# Patient Record
Sex: FEMALE | Race: WHITE
[De-identification: names, ages, dates, MRNs, and addresses within clinical notes are randomized per-mention and may not be internally consistent; named-entity substitution may affect disease eponyms.]

---

## 2017-02-22 ENCOUNTER — HOSPITAL ENCOUNTER (OUTPATIENT)
Dept: HOSPITAL 74 - JOR | Age: 47
Discharge: HOME | End: 2017-02-22
Attending: SURGERY
Payer: COMMERCIAL

## 2017-02-22 VITALS — DIASTOLIC BLOOD PRESSURE: 76 MMHG | HEART RATE: 100 BPM | SYSTOLIC BLOOD PRESSURE: 120 MMHG

## 2017-02-22 VITALS — TEMPERATURE: 97.8 F

## 2017-02-22 VITALS — BODY MASS INDEX: 31.6 KG/M2

## 2017-02-22 DIAGNOSIS — J45.909: ICD-10-CM

## 2017-02-22 DIAGNOSIS — T80.219A: Primary | ICD-10-CM

## 2017-02-22 DIAGNOSIS — C79.60: ICD-10-CM

## 2017-02-22 DIAGNOSIS — Z85.3: ICD-10-CM

## 2017-02-22 DIAGNOSIS — B99.9: ICD-10-CM

## 2017-02-22 DIAGNOSIS — I42.9: ICD-10-CM

## 2017-02-22 LAB
ALBUMIN SERPL-MCNC: 3.4 G/DL (ref 3.4–5)
ALP SERPL-CCNC: 94 U/L (ref 45–117)
ALT SERPL-CCNC: 21 U/L (ref 12–78)
ANION GAP SERPL CALC-SCNC: 9 MMOL/L (ref 8–16)
AST SERPL-CCNC: 15 U/L (ref 15–37)
BILIRUB SERPL-MCNC: 0.3 MG/DL (ref 0.2–1)
CALCIUM SERPL-MCNC: 8 MG/DL (ref 8.5–10.1)
CO2 SERPL-SCNC: 30 MMOL/L (ref 21–32)
CREAT SERPL-MCNC: 1.2 MG/DL (ref 0.55–1.02)
DEPRECATED RDW RBC AUTO: 13.8 % (ref 11.6–15.6)
EOSINOPHIL # BLD: 1 % (ref 0–4.5)
GLUCOSE SERPL-MCNC: 78 MG/DL (ref 74–106)
MCH RBC QN AUTO: 32.9 PG (ref 25.7–33.7)
MCHC RBC AUTO-ENTMCNC: 35.1 G/DL (ref 32–36)
MCV RBC: 93.8 FL (ref 80–96)
METAMYELOCYTES NFR BLD: 1 % (ref 0–2)
NEUTROPHILS # BLD: 12 % (ref 42.8–82.8)
PLATELET # BLD AUTO: 100 K/MM3 (ref 134–434)
PMV BLD: 9.9 FL (ref 7.5–11.1)
PROT SERPL-MCNC: 6.5 G/DL (ref 6.4–8.2)
WBC # BLD AUTO: 3.5 K/MM3 (ref 4–10)

## 2017-02-22 PROCEDURE — 0JPT0XZ REMOVAL OF TUNNELED VASCULAR ACCESS DEVICE FROM TRUNK SUBCUTANEOUS TISSUE AND FASCIA, OPEN APPROACH: ICD-10-PCS | Performed by: SURGERY

## 2017-02-22 RX ADMIN — HYDROMORPHONE HYDROCHLORIDE PRN MG: 1 INJECTION, SOLUTION INTRAMUSCULAR; INTRAVENOUS; SUBCUTANEOUS at 14:20

## 2017-02-22 RX ADMIN — HYDROMORPHONE HYDROCHLORIDE PRN MG: 1 INJECTION, SOLUTION INTRAMUSCULAR; INTRAVENOUS; SUBCUTANEOUS at 14:35

## 2017-02-22 NOTE — OP
Operative Note





- Note:


Operative Date: 02/22/17


Pre-Operative Diagnosis: infected portacath


Operation: Removal of portacath


Findings: 


cultures taken from wound 


Post-Operative Diagnosis: Same as Pre-op


Surgeon: Marino Smith


Anesthesia: Fractional


Estimated Blood Loss (mls): 10


Operative Report Dictated: Yes

## 2017-02-22 NOTE — PDOC
History of Present Illness





- General


History Source: Patient


Exam Limitations: No Limitations





- History of Present Illness


Initial Comments: 


02/22/17 11:07


The patient is a 46-year-old woman, with a significant past medical history of 

asthma, cardiomyopathy, breast ([id number] status post bilateral mastectomies) 

and ovarian Ca (Stage III; on IV taxol; Finished her third cycle last week), 

colon polyps,  who presents to the emergency department for further evaluation 

of a possible reocurring left sided  infected chest port since yesterday. As 

per patient, she reports that she noted slight redness around her port, 

approximately two weeks . She was on a ten day course of Keflex, for which she 

finished approximately 5 days ago. She states both peripheral and port blood 

cultures were drawn and were negative. She reports she has remained asymptomatic

, no fevers, chills, cough, chest pain, lightheadedness, dizziness, palpitations

, abdominal pain, nausea, vomiting. She reports noting redness around her port, 

yesterday. She states she has remained NPO. 





Upon interview, patients PMD, Dr. Olga Lidia Hicks was at the bedside. She reports 

she has spoken to Vascular Surgeon, Dr. Ileana Gunter and is aware about this 

case.





Allergies: Piperacillin Sodium, Tazobactam. Docetaxel.


Past Surgical History: Incarcerated hernia. Lumbar laminectomy. Bilateral 

Mastectomies.


Social History: Nurse. Former smoker. No ETOH and recreational drug use. 


Primary Care Physician: Dr. Olga Lidia Hicks Office: [phone]/ [phone]


Oncologist: Dr. Jarocho France [phone]


Vascular Surgeon: Dr. Marino Gunter [phone] 





<Rose Lindsay - Last Filed: 02/22/17 14:01>





<Negrito Vences - Last Filed: 02/24/17 09:07>





- General


Chief Complaint: Wound


Stated Complaint: INFFECTED PORT/ CATHERER


Time Seen by Provider: 02/22/17 11:05





Past History





<Rose Lindsay - Last Filed: 02/22/17 14:01>





- Past Medical History


Anemia: No


Asthma: Yes


Cancer: Yes (BREAST CA,ovarian ca stage 3)


Cardiac Disorders: No


CVA: No


COPD: No


CHF: No


Dementia: No


Diabetes: No


GI Disorders: No


 Disorders: No


HTN: No


Hypercholesterolemia: No


Liver Disease: No


Seizures: No


Thyroid Disease: No





- Surgical History


Abdominal Surgery: Yes (incarcerated hernia)


Appendectomy: No


Cardiac Surgery: No


Cholecystectomy: No


Lung Surgery: No


Neurologic Surgery: No


Orthopedic Surgery: Yes (LUMBAR LAMINECTOMY)





- Psycho/Social/Smoking Cessation Hx


Anxiety: No


Suicidal Ideation: No


Smoking History: Former smoker


Have you smoked in the past 12 months: Yes


If you are a former smoker, when did you quit?: 6 months


Information on smoking cessation initiated: No


'Breaking Loose' booklet given: 05/09/16


Hx Alcohol Use: No


Drug/Substance Use Hx: No


Substance Use Type: None


Hx Substance Use Treatment: No





<Negrito Vences - Last Filed: 02/24/17 09:07>





- Past Medical History


Allergies/Adverse Reactions: 


 Allergies











Allergy/AdvReac Type Severity Reaction Status Date / Time


 


piperacillin sodium Allergy Severe Hives Verified 02/22/17 10:55





[From Zosyn]     


 


tazobactam sodium Allergy Severe Hives Verified 02/22/17 10:55





[From Zosyn]     


 


docetaxel [From Taxotere] Allergy   Verified 02/22/17 10:55


 


PROLINE SUTURES Allergy Severe  Uncoded 02/22/17 10:55











Home Medications: 


Ambulatory Orders





Budesonide/Formeterol Fumarate [SYMBICORT 160/4.5mcg -] 1 inh PO BID #1 inhaler 

10/13/14 


Esomeprazole Magnesium [Nexium 24Hr] 20 mg PO DAILY PRN 05/09/16 


Nebivolol HCl [Bystolic] 2.5 mg PO HS 05/09/16 


Acetaminophen [Tylenol .Regular Strength -] 325 mg PO Q6H PRN #0 tablet 05/11/ 16 


Ramipril 2.5 mg PO HS 11/04/16 


Oxycodone HCl/Acetaminophen [Percocet 5-325 mg Tablet] 1 - 2 tab PO Q4H #20 

tablet MDD 6 11/07/16 


Furosemide [Lasix -] 20 mg PO DAILY 12/19/16 


Magnesium Oxide [Magnesium] 500 mg PO BID 12/19/16 


Potassium Chloride [K-Dur -] 20 meq PO BID 12/19/16 


Cephalexin Monohydrate [Keflex -] 500 mg PO Q8H #30 capsule 02/22/17 


Filgrastim [Neupogen] 480 mcg IJ DAILY #0  02/22/17 











**Review of Systems





- Review of Systems


Able to Perform ROS?: Yes


Comments:: 


02/22/17 11:07


CONSTITUTIONAL: 


No reported: Fever, Chills, Diaphoresis, Generalized Weakness, Malaise, Loss of 

Appetite 


HEENT: 


No reported: Rhinorrhea, Nasal Congestion, Throat Pain, Throat Swelling, 

Difficulty Swallowing, Mouth Swelling, Ear Pain, Eye Pain, Visual Changes 


CARDIOVASCULAR: 


Reported: Chest Pain. Eleevated Blood Pressure and Heart Rate. No reported: 

Syncope, Irregular Heart Rate, Lightheadedness, Peripheral Edema 


RESPIRATORY: 


No reported: Cough, Shortness of Breath, SOB with Exertion, Orthopnea, Wheezing

, Stridor, Hemoptysis 


GASTROINTESTINAL: 


No reported: Abdominal pain, Abdominal Distension, Nausea, Vomiting, Diarrhea, 

Constipation, Melena, Hematochezia 


GENITOURINARY: 


Reported: Flank Pain. No reported: Dysuria, Frequency, Urgency, Hesitancy, 

Genital Pain 


MUSCULOSKELETAL: 


Reported: Back Pain. No reported: Myalgia, Arthralgia, Joint Swelling,  Neck 

Pain 


SKIN: 


No reported: Rash, Itching, Pallor 


HEMEATOLOGIC/IMMUNOLOGIC: 


Reported: Possible reocurring left sided chest port infection.No reported: Easy 

Bleeding, Easy Bruising, Lymphadenopathy.


ENDOCRINE: 


No reported: Unexplained Weight Gain, Unexplained Weight Loss, Heat Intolerance

, Cold Intolerance 


NEUROLOGIC: 


No reported: Headache, Focal Weakness, Paresthesias, Vertigo, Lightheadedness, 

Unsteady Gait, Seizure, Mental Status Changes, Incontinence 


PSYCHIATRIC: 


No reported: Anxiety, Depression





<Rose Lindsay - Last Filed: 02/22/17 14:01>





*Physical Exam





- Vital Signs


 Last Vital Signs











Temp Pulse Resp BP Pulse Ox


 


 98.4 F   103 H  18   143/57   98 


 


 02/22/17 10:56  02/22/17 10:56  02/22/17 10:56  02/22/17 10:56  02/22/17 10:56














- Physical Exam


Comments: 


02/22/17 11:07


GENERAL: The patient is awake, alert, and fully oriented, Nontoxic - in no 

acute distress.


HEAD: Normocephalic, atraumatic.


EYES: extraocular movements intact, sclera anicteric, conjunctiva clear.


ENT: Normal voice,  Moist mucous membranes.


NECK: Normal range of motion, supple


LUNGS: Breath sounds equal, clear to auscultation bilaterally.  No wheezes, no 

rhonchi, no rales.


HEART: Regular rate and rhythm,  without murmur, rub or gallop.


ABDOMEN: Soft, nontender, normoactive bowel sounds.  No guarding, no rebound.No 

CVA tenderness


EXTREMITIES: Normal range of motion, no edema.  No clubbing or cyanosis. No 

cords, erythema, or tenderness.


NEUROLOGICAL: No facial assymetry, Normal speech, 


PSYCH: Normal mood, normal affect.


SKIN: There is a left chest port with erythema and warmth over the access site.





<Rose Lindsay - Last Filed: 02/22/17 14:01>





- Vital Signs


 Last Vital Signs











Temp Pulse Resp BP Pulse Ox


 


 98.4 F   103 H  18   143/57   98 


 


 02/22/17 10:56  02/22/17 10:56  02/22/17 10:56  02/22/17 10:56  02/22/17 10:56














<Negrito Vences - Last Filed: 02/24/17 09:07>





ED Treatment Course





- LABORATORY


CBC & Chemistry Diagram: 


 02/22/17 12:28





 02/22/17 12:28





<Rose Lindsay - Last Filed: 02/22/17 14:01>





- LABORATORY


CBC & Chemistry Diagram: 


 02/22/17 12:28





 02/22/17 12:28





<Negrito Vences - Last Filed: 02/24/17 09:07>





Medical Decision Making





- Medical Decision Making





02/22/17 11:38


Call was placed to Dr. Gunter's mobile phone at [phone]. Immediate 

response. Case was discussed.





<Rose Lindsay - Last Filed: 02/22/17 14:01>





- Medical Decision Making


02/22/17 11:24


46y F hx of ovarian ca currently on chemo, with port in the R chest, presents 

with erythema overlying her R chest - pt was formerly on course of keflex 2 

weeks ago for the same with resolution of the erythema, but returned 2 days 

ago. No systemic complaints. Pt chest noted to have erythema/warmth overlying 

access area of her port.  noted slightly tachycardic here but vitals otherwise 

normal.





will discuss with dr. gunter regarding replcement


will obtain repeat labs.cultures





likely admission


case d/w dr. hicks





A portion of this note was documented by scribe services under my direction. I 

have reviewed the details of the note, within reason, and agree with the 

documentation with the following case summary and management plan written by me





<Negrito Vences - Last Filed: 02/24/17 09:07>





*DC/Admit/Observation/Transfer





- Attestations


Scribe Attestion: 


02/22/17 11:07


Documentation prepared by Rose Lindsay, acting as medical scribe for 

Negrito Vences MD.





<Rose Lindsay - Last Filed: 02/22/17 14:01>





- Discharge Dispostion


Admit: Yes





<Negrito Vences - Last Filed: 02/24/17 09:07>


Diagnosis at time of Disposition: 


Infection due to portacath


Qualifiers:


 Encounter type: initial encounter Qualified Code(s): T80.219A - Unspecified 

infection due to central venous catheter, initial encounter





- Discharge Dispostion


Condition at time of disposition: Good





- Prescriptions

## 2017-02-22 NOTE — HP
Admitting History and Physical





- Primary Care Physician


PCP: Olga Lidia Hicks S





- Admission


Chief Complaint: infected port


History of Present Illness: 


The patient is a 46-year-old woman, with a significant past medical history of 

asthma, cardiomyopathy, breast CA ([id number] status post bilateral mastectomies

) and ovarian Ca (Stage III; on IV taxol; Finished her third cycle last week), 

colon polyps,  who presents to the emergency department for further evaluation 

of a possible reocurring left sided  infected chest port since yesterday. As 

per patient, she reports that she noted slight redness around her port, 

approximately two weeks . She was on a ten day course of Keflex, for which she 

finished approximately 5 days ago. She states both peripheral and port blood 

cultures were drawn and were negative. She reports she has remained asymptomatic

, no fevers, chills, cough, chest pain, lightheadedness, dizziness, palpitations

, abdominal pain, nausea, vomiting. She reports noting redness around her port, 

yesterday. She states she has remained NPO. 





Allergies: Piperacillin Sodium, Tazobactam. Docetaxel.


History Source: Patient, Medical Record


Limitations to Obtaining History: No Limitations





- Past Medical History


CNS: Yes: Migraine


Cardiovascular: Yes: CHF, Other (cardiomyopathy s/p chemotx, EF 45%)


Pulmonary: Yes: Asthma


Gastrointestinal: Yes: GERD, Other (hyperplastic colon polyps  and , 

pancreatic cyst on  MRI, hiatal hernia )


Hepatobiliary: Yes: Other (GB sludge)


...LMP: 10/05/15


Heme/Onc: Yes: Cancer (breasts CA bilat s/p mastectomy)





- Past Surgical History


Past Surgical History: Yes: Breast Biopsy, Colonoscopy, , Laminectomy, 

Mastectomy, Upper Endoscopy





- Smoking History


Smoking history: Former smoker


Have you smoked in the past 12 months: Yes


If you are a former smoker, when did you quit?: 6 months





- Alcohol/Substance Use


Hx Alcohol Use: No


History of Substance Use: reports: None





- Social History


Usual Living Arrangement: Yes: With Spouse


ADL: Independent


Occupation: RN Administrative


History of Recent Travel: No





Home Medications





- Allergies


Allergies/Adverse Reactions: 


 Allergies











Allergy/AdvReac Type Severity Reaction Status Date / Time


 


piperacillin sodium Allergy Severe Hives Verified 17 10:55





[From Zosyn]     


 


tazobactam sodium Allergy Severe Hives Verified 17 10:55





[From Zosyn]     


 


docetaxel [From Taxotere] Allergy   Verified 17 10:55


 


PROLINE SUTURES Allergy Severe  Uncoded 17 10:55














- Home Medications


Home Medications: 


Ambulatory Orders





Budesonide/Formeterol Fumarate [SYMBICORT 160/4.5mcg -] 1 inh PO BID #1 inhaler 

10/13/14 


Esomeprazole Magnesium [Nexium 24Hr] 20 mg PO DAILY PRN 16 


Nebivolol HCl [Bystolic] 2.5 mg PO HS 16 


Acetaminophen [Tylenol .Regular Strength -] 325 mg PO Q6H PRN #0 tablet  


Ramipril 2.5 mg PO HS 16 


Oxycodone HCl/Acetaminophen [Percocet 5/325 -] 1 - 2 tab PO Q4H #20 tablet MDD 

6 16 


Furosemide [Lasix -] 20 mg PO DAILY 16 


Magnesium Oxide [Magnesium] 500 mg PO BID 16 


Potassium Chloride [K-Dur -] 20 meq PO BID 16 


Filgrastim [Neupogen] 480 mcg IJ DAILY 17 











Family Disease History





- Family Disease History


Family Disease History: Heart Disease: Father, CA: Mother (Breast cancer, colon 

cancer), Other: Mother, Sister (Sjogren's syndrome)





Review of Systems





- Review of Systems


Constitutional: denies: Chills, Fever


Eyes: denies: Blurred Vision, Double Vision


HENT: denies: Difficult Swallowing, Ear Pain


Neck: denies: Stiffness, Tenderness


Gastrointestinal: denies: Abdominal Pain, Bloating, Constipation, Diarrhea, 

Vomiting


Genitourinary: denies: Dysuria, Flank Pain


Neurological: denies: Change in LOC, Change in Speech, Confusion


Hematology/Lymphatic: denies: Easily Bruised, Excessive Bleeding


Psychiatric: denies: Altered Sleep Pattern, Anxiety, Depression





Physical Examination


Vital Signs: 


 Vital Signs











Temperature  98.4 F   17 10:56


 


Pulse Rate  103 H  17 10:56


 


Respiratory Rate  18   17 10:56


 


Blood Pressure  143/57   17 10:56


 


O2 Sat by Pulse Oximetry (%)  98   17 10:56











Constitutional: Yes: No Distress, Calm


Eyes: Yes: Conjunctiva Clear


HENT: Yes: Atraumatic


Neck: Yes: Supple


Cardiovascular: Yes: Regular Rate and Rhythm


Respiratory: Yes: CTA Bilaterally


Gastrointestinal: Yes: Soft.  No: Distention, Tenderness


Renal/: No: CVA Tenderness - Left, CVA Tenderness - Right


Musculoskeletal: No: Joint Stiffness, Joint Swelling


Extremities: No: Cold, Cool


Edema: No


Integumentary: Yes: Rash (over L upper chest port).  No: Venous Stasis Changes


Neurological: Yes: WNL, Alert, Oriented


...Motor Strength: WNL


Psychiatric: Yes: WNL, Alert, Oriented.  No: Agitated, Suicidal Ideation





Assessment/Plan


The patient is a 46-year-old woman, with a significant past medical history of 

asthma, cardiomyopathy, breast ([id number] status post bilateral mastectomies) 

and ovarian Ca (Stage III; on IV taxol; Finished her third cycle last week), 

colon polyps,  who presents to the emergency department for further evaluation 

of a possible reocurring left sided  infected chest port since yesterday. s/p 

recent po Keflex.


d/w vascular sx dr Smith (covering dr Trujillo who is in vacation)


will removed infected port


PO keflex, further ATB depending on intraop findings


pt received neupogen 1 dose today in onc office, for WBC 2.7

## 2017-02-22 NOTE — DS
Physical Examination


Vital Signs: 


 Vital Signs











Temperature  97.8 F   02/22/17 15:15


 


Pulse Rate  76   02/22/17 15:15


 


Respiratory Rate  16   02/22/17 15:15


 


Blood Pressure  99/50   02/22/17 15:15


 


O2 Sat by Pulse Oximetry (%)  96   02/22/17 15:00











Findings/Remarks: 


s/p port removal for infection


OK to DC home on po Keflex per surgery


f/u with PCP, Oncology and vascular sx in 1-2 weeks


check labs CBC CMP in 2-3 days


cardio f/u as advised


d/w pt scripts doen as needed


Constitutional: Yes: No Distress, Calm


Eyes: Yes: Conjunctiva Clear


HENT: Yes: Atraumatic


Neck: Yes: Supple


Cardiovascular: Yes: Regular Rate and Rhythm


Respiratory: Yes: CTA Bilaterally


Gastrointestinal: Yes: Soft.  No: Distention, Tenderness


Musculoskeletal: No: Joint Stiffness, Joint Swelling


Extremities: No: Cold, Cool


Edema: No


Peripheral Pulses WNL: Yes


Integumentary: No: Rash, Venous Stasis Changes


Neurological: Yes: WNL, Alert, Oriented


...Motor Strength: WNL


Psychiatric: Yes: WNL, Alert, Oriented.  No: Agitated


Labs: 


 CBC, BMP





 02/22/17 12:28 





 02/22/17 12:28 











Discharge Summary


Reason For Visit: INFECTION DUE TO PORTACATH


Current Active Problems





Infection due to portacath (Acute) 








Procedures: Principal: port infection


Other Procedures: removed by vascular sx


Hospital Course: 


po antibiotics; DC home, f/u as advised


Condition: Good





- Instructions


Diet, Activity, Other Instructions: 


S/P portacath removal. 


 keflex on the way home. 


Can remove dressing in two days. 


Leave steri strips on. 


Come back to office in one week. 


5th floor -- Vascular/Wound care clinic


f/u PCP, cardiology and oncology and labs as advised 


call for appt -- [phone]


Disposition: HOME





- Home Medications


Comprehensive Discharge Medication List: 


Ambulatory Orders





Budesonide/Formeterol Fumarate [SYMBICORT 160/4.5mcg -] 1 inh PO BID #1 inhaler 

10/13/14 


Esomeprazole Magnesium [Nexium 24Hr] 20 mg PO DAILY PRN 05/09/16 


Nebivolol HCl [Bystolic] 2.5 mg PO HS 05/09/16 


Acetaminophen [Tylenol .Regular Strength -] 325 mg PO Q6H PRN #0 tablet 05/11/ 16 


Ramipril 2.5 mg PO HS 11/04/16 


Oxycodone HCl/Acetaminophen [Percocet 5-325 mg Tablet] 1 - 2 tab PO Q4H #20 

tablet MDD 6 11/07/16 


Furosemide [Lasix -] 20 mg PO DAILY 12/19/16 


Magnesium Oxide [Magnesium] 500 mg PO BID 12/19/16 


Potassium Chloride [K-Dur -] 20 meq PO BID 12/19/16 


Cephalexin Monohydrate [Keflex -] 500 mg PO Q8H #30 capsule 02/22/17 


Filgrastim [Neupogen] 480 mcg IJ DAILY #0  02/22/17

## 2017-02-23 NOTE — OP
DATE OF OPERATION:  2017

 

PREOPERATIVE DIAGNOSIS:  Infected Port-A-Cath.

 

POSTOPERATIVE DIAGNOSIS:  Infected Port-A-Cath.

 

PROCEDURE:  Removal of Port-A-Cath.

 

SURGEON:  Marino Funez DO

 

ANESTHESIA:  Fractional. 

 

BLOOD LOSS:  10 mL.

 

INDICATIONS:  The patient is a 46-year-old female who has a left subclavian

Port-A-Cath.  She went to her doctor today and found the Port-A-Cath site to be

having a lot of erythema and asked her to come to the hospital to have it removed. 

The patient was consented for the procedure understanding all risks, benefits, and

alternatives then taken to the operating room.

 

DESCRIPTION OF PROCEDURE:  Once in the operating suite, the area of the left neck and

chest were prepped and draped in a sterile surgical manner.  We then went ahead and

injected 20 mL lidocaine 1% over the Port-A-Cath.  We then used a No. 15 blade and

made a 4-cm incision using the knife.  Once the incision was made, Bovie

electrocautery was used to control hemostasis, and we were able to dissect out the

Port-A-Cath and then remove it.  Once removed, there was some bleeding in the area,

which  down when we performed Bovie electrocautery to it.  At this point, we

irrigated the wound copiously and we went ahead and used 3-0 Vicryl.  We put

subcutaneous sutures in to approximate the subcutaneous tissue.  The skin was then

closed with 4-0 Biosyn in a subcuticular running fashion.  The area was wet and

dried.  Steri-Strips were placed.  Then 4 x 4 Tegaderms were placed.  The patient

tolerated the procedure with no complications.

 

 

 

MARINO FUNEZ DO

 

[id number]

DD: 2017 14:36

DT: 2017 13:23

Job #:  [id number]

## 2017-02-24 NOTE — PATH
Surgical Pathology Report



Patient Name:  ORIN GORMAN

Accession #:  

Med. Rec. #:  [id number]                                                        

   /Age/Gender:  1970 (Age: 46) / F

Account:  [id number]                                                          

             Location: AMBULATORY SURG

Taken:  2017

Received:  2017

Reported:  2017

Physicians:  Marino Smith

  



Specimen(s) Received

 PORT-A-CATH 





Clinical History

Infection DH Port-a-cath







Final Diagnosis

PORT-A-CATH, REMOVAL:

MEDICAL DEVICE CONSISTENT WITH PORT-A-CATH (GROSS EXAM).





***Electronically Signed***

Alexander Finkelstein, M.D.





Gross Description

Received fresh, labeled "Port-A-Cath" is a 3.8 x 2.8 x 1.5 cm purple, irregular

device, consistent with a dottie cath. The Port-A-Cath displays a 23 cm in length

portion of white tubing extending from one aspect. No soft tissue is present. No

sections are submitted, gross only.





[facility]

## 2017-03-06 ENCOUNTER — HOSPITAL ENCOUNTER (OUTPATIENT)
Dept: HOSPITAL 74 - JASU-SURG | Age: 47
Discharge: HOME | End: 2017-03-06
Attending: OBSTETRICS & GYNECOLOGY
Payer: COMMERCIAL

## 2017-03-06 VITALS — BODY MASS INDEX: 31.6 KG/M2

## 2017-03-06 VITALS — SYSTOLIC BLOOD PRESSURE: 86 MMHG | DIASTOLIC BLOOD PRESSURE: 56 MMHG | HEART RATE: 78 BPM

## 2017-03-06 VITALS — TEMPERATURE: 98.3 F

## 2017-03-06 DIAGNOSIS — Z46.82: Primary | ICD-10-CM

## 2017-03-06 DIAGNOSIS — C79.60: ICD-10-CM

## 2017-03-06 PROCEDURE — 0WPG03Z REMOVAL OF INFUSION DEVICE FROM PERITONEAL CAVITY, OPEN APPROACH: ICD-10-PCS | Performed by: OBSTETRICS & GYNECOLOGY

## 2017-03-06 NOTE — DS
Physical Examination


Vital Signs: 


 Vital Signs











Temperature  97.6 F   03/06/17 07:40


 


Pulse Rate  100 H  03/06/17 07:40


 


Respiratory Rate  20   03/06/17 07:40


 


Blood Pressure  116/66   03/06/17 07:40


 


O2 Sat by Pulse Oximetry (%)  96   03/06/17 07:37











Constitutional: Yes: Well Nourished


Eyes: Yes: WNL


HENT: Yes: WNL


Neck: Yes: WNL


Cardiovascular: Yes: WNL


Respiratory: Yes: WNL


Gastrointestinal: Yes: WNL





Discharge Summary


Reason For Visit: OVARIAN CANCER


Condition: Good





- Instructions


Diet, Activity, Other Instructions: 


May have regular diet, no heavy lifting and no driving while taking percocet.


Disposition: HOME





- Home Medications


Comprehensive Discharge Medication List: 


Ambulatory Orders





Nebivolol HCl [Bystolic] 2.5 mg PO HS 05/09/16 


Ramipril 2.5 mg PO HS 11/04/16 


Budesonide/Formeterol Fumarate [SYMBICORT 160/4.5mcg -] 1 inh PO BID 03/02/17 


Ranitidine HCl [Zantac] 150 mg PO DAILY PRN 03/02/17

## 2017-03-06 NOTE — OP
Operative Note





- Note:


Operative Date: 03/06/17


Pre-Operative Diagnosis: ovarian cancer


Operation: removal of portacatheter


Findings: 


port on LUQ


Post-Operative Diagnosis: Same as Pre-op


Surgeon: Omari Tarango


Anesthesia: General


Estimated Blood Loss (mls): 0


Operative Report Dictated: Yes

## 2017-03-07 NOTE — OP
DATE OF OPERATION:  03/06/2017 

 

PROCEDURE:  Removal intraperitoneal Port-A-Cath

 

PREOPERATIVE DIAGNOSIS:  Ovarian cancer status post intraperitoneal chemotherapy

 

POSTOPERATVE DIAGNOSIS:  Ovarian cancer status post intraperitoneal chemotherapy

 

SURGEON:  Omari Tarango MD 

 

ANESTHESIA:  General

 

ESTIMATED BLOOD LOSS:  Minimal 

 

OPERATIVE FINDINGS:  The patient has a Port-A-Cath placed at the left upper quadrant

of the abdomen.  

 

OPERATIVE PROCEDURE:  Under general anesthesia, skin was prepped and draped in usual

manner for abdominal procedure.  After time out, an incision was made over the

previous incision.  This was followed by identifying the port and dissecting the port

free from its adjacent tissues and removing the sutures.  The Port was then removed

successfully and completely.  Subcutaneous tissue was irrigated and hemostasis

secured; 0.5% Marcaine 30 mL was given; 2-0 Vicryl suture was used to close the deep

subcutaneous space, and 4-0 Monocryl to skin.  The patient tolerated the procedure

well.  She was then extubated and transported to the recovery room in stable

condition.  Lap and instrument count was correct x 2.  

 

RICHI [id number]

DD: 03/06/2017 08:40

DT: 03/07/2017 03:52

Job #:  [id number]

## 2017-07-26 ENCOUNTER — HOSPITAL ENCOUNTER (INPATIENT)
Dept: HOSPITAL 74 - JASUSAT | Age: 47
LOS: 2 days | Discharge: HOME | DRG: 355 | End: 2017-07-28
Attending: SURGERY | Admitting: SURGERY
Payer: COMMERCIAL

## 2017-07-26 VITALS — BODY MASS INDEX: 33.6 KG/M2

## 2017-07-26 DIAGNOSIS — Z85.3: ICD-10-CM

## 2017-07-26 DIAGNOSIS — K21.9: ICD-10-CM

## 2017-07-26 DIAGNOSIS — J45.909: ICD-10-CM

## 2017-07-26 DIAGNOSIS — K43.0: Primary | ICD-10-CM

## 2017-07-26 DIAGNOSIS — I10: ICD-10-CM

## 2017-07-26 PROCEDURE — 0WUF0JZ SUPPLEMENT ABDOMINAL WALL WITH SYNTHETIC SUBSTITUTE, OPEN APPROACH: ICD-10-PCS | Performed by: SURGERY

## 2017-07-26 RX ADMIN — ACETAMINOPHEN SCH MG: 10 INJECTION, SOLUTION INTRAVENOUS at 16:55

## 2017-07-26 RX ADMIN — RAMIPRIL SCH MG: 2.5 CAPSULE ORAL at 21:23

## 2017-07-26 RX ADMIN — BUDESONIDE AND FORMOTEROL FUMARATE DIHYDRATE SCH PUFF: 160; 4.5 AEROSOL RESPIRATORY (INHALATION) at 21:13

## 2017-07-26 RX ADMIN — ACETAMINOPHEN SCH: 10 INJECTION, SOLUTION INTRAVENOUS at 18:58

## 2017-07-26 RX ADMIN — NEBIVOLOL HYDROCHLORIDE SCH MG: 5 TABLET ORAL at 21:23

## 2017-07-26 RX ADMIN — ACETAMINOPHEN SCH MG: 10 INJECTION, SOLUTION INTRAVENOUS at 23:17

## 2017-07-27 RX ADMIN — ENOXAPARIN SODIUM SCH MG: 40 INJECTION SUBCUTANEOUS at 10:15

## 2017-07-27 RX ADMIN — BUDESONIDE AND FORMOTEROL FUMARATE DIHYDRATE SCH PUFF: 160; 4.5 AEROSOL RESPIRATORY (INHALATION) at 22:16

## 2017-07-27 RX ADMIN — ACETAMINOPHEN SCH MG: 10 INJECTION, SOLUTION INTRAVENOUS at 04:30

## 2017-07-27 RX ADMIN — BUDESONIDE AND FORMOTEROL FUMARATE DIHYDRATE SCH PUFF: 160; 4.5 AEROSOL RESPIRATORY (INHALATION) at 10:14

## 2017-07-27 RX ADMIN — NEBIVOLOL HYDROCHLORIDE SCH: 5 TABLET ORAL at 22:17

## 2017-07-27 RX ADMIN — ACETAMINOPHEN SCH MG: 10 INJECTION, SOLUTION INTRAVENOUS at 10:47

## 2017-07-27 RX ADMIN — RAMIPRIL SCH: 2.5 CAPSULE ORAL at 22:16

## 2017-07-27 NOTE — PN
Progress Note (short form)





- Note


Progress Note: 


Anesthesia POD#1


S/P Open Component Separation with mesh under GA and TAP blook


Vss,no N/V,started orals,pain is under control.


No complications to anesthesia seen.





Lauryn Zuleta MD.

## 2017-07-27 NOTE — PN
Progress Note (short form)





- Note


Progress Note: 


surgery





pt seen and exmained.  pain somewhat controlled.  oob.  voiding.  tolerating 

liquids.  





afebrile





abd- soft, moderate distension, incision clean, sandra serous x 2.





A/P





1)  Pod#1- regular diet, stop ivf, cont sandra





2)  pain- motrin, tylenol, morphine, oxycodone





3)  prophylaxis- lovenox, protonix, oob





4)  htn- betablocker, ace inhibitor





poss d/c tomorrow with better pain control

## 2017-07-27 NOTE — OP
DATE OF OPERATION:  07/26/2017

 

PREOPERATIVE DIAGNOSIS:  Complex, chronically incarcerated ventral/incisional

abdominal wall hernia.

 

POSTOPERATIVE DIAGNOSIS:  Complex, chronically incarcerated ventral/incisional

abdominal wall hernia.

 

PROCEDURE:  Open bilateral-component separation repair of complex, chronically

incarcerated ventral/incisional abdominal wall hernia with mesh.

 

OPERATING SURGEON:  Pool Lozano MD

 

FIRST ASSISTANT:  Eligio Casillas MD

 

ANESTHESIA:  General, Nelda Chase MD

 

HISTORY:  This is a 46-year-old woman who has had several prior abdominal 
procedures.

 She carries a diagnosis of fallopian tube malignancy and has also received 
chemotherapy.  She

presents now for repair of a rather large, complex, chronically incarcerated

ventral/incisional abdominal wall hernia.

 

Indications, alternatives, possible complications reviewed.  Consent obtained.

 

DESCRIPTION OF PROCEDURE:  With the patient in the supine position and after 
general

anesthesia, the abdomen was prepped and draped in sterile fashion using

chlorhexidine.

 

An incision was made in the pre-existing scar beginning above the umbilicus and

extending for a significant distance below the umbilicus.  The incision was 
deepened

into the subcutaneous space.  In the subcutaneous space, the large hernia sac 
was

easily encountered.  The hernia sac was opened.  The hernia contained obvious 
loops

of small bowel.

 

Lysis of adhesions ensued, freeing the small bowel from portions of the hernia 
sac as

well as the fascial ring and the undersurface of the anterior abdominal wall,

ultimately reducing all of the small bowel.

 

First directing our attention to the right side of the anterior abdominal wall, 
the

posterior sheath was  from the overlying rectus muscle fibers.  This

separation and dissection was carried out in the lateral direction both 
inferiorly

and superiorly.  Ultimately, the junction of the oblique and transversus 
musculature

with the lateral rectus edge was encountered.  Transversus abdominis rectus 
release

ensued, and the retrorectus plane was continued to be created again in the 
lateral

direction, inferiorly to the level of the iliac crest and superiorly to the 
level of

the costal margin.

 

Now directing our attention to the patient's left side, again the posterior 
sheath

was  from the overlying rectus musculature, and the retrorectus space

developed in the lateral direction, again proceeding inferiorly and superiorly. 

Again, a left transverse rectus abdominis release was performed under direct 
vision,

and the retrorectus space further developed and extended again laterally, in the

superior direction again to the costal margin and inferior direction to the 
level of

the iliac crests.

 

The posterior sheath was then approximated, re-creating the posterior midline.  
This

was accomplished using a continuous 3-0 Maxon suture.  After the posterior 
midline

was created, a custom Composite mesh was fashioned to span the distance in the

retrorectus space.

 

A piece of Phasix mesh measuring 24.4 cm x 30.5 cm was sutured to a piece of 
Versatex

mesh.  The Versatex mesh was larger in size and subsequently trimmed to the 
actual

Phasix mesh size.  This was accomplished using interrupted, circumferential 3-0

Vicryl sutures.

 

The large custom Composite mesh was then placed in the retrorectus space with 
the

Phasix side down.  The mesh was spread out in all directions, and using an

AbsorbaTack and counter-palpation, the mesh was fixed peripherally to the 
overlying

anterior musculature.  Mesh was tacked at the costal margins superiorly.  The 
mesh

was tacked inferiorly close to the pubic tubercle.  The mesh was tacked 
laterally in

all directions.

 

The retrorectus space and Composite mesh were irrigated and adequate hemostasis

ensured.  The overlying anterior fascia was then closed using No. 1 PDS suture 
in a

continuous fashion.  A suture was started both superiorly and inferiorly and 
was run

in a continuous fashion and ultimately tied at the wound mid-point, while 
leaving the

custom Composite mesh entirely in the retrorectus space.

 

Two Pawan-Lucia drains were placed in the subcutaneous space and exited 
through 2

defects in the skin in the right and left lower quadrants where the drains were

tacked to the skin using 2-0 silk suture material.

 

The subcutaneous space was irrigated and adequate hemostasis ensured.

 

The undersurface of the umbilicus was tacked to the underlying fascia to create 
the

usual inward appearance.  The wound was then closed in layers.  The subcutaneous

tissues were approximated using interrupted 2-0 chromic suture.  The 
subcuticular

layer was approximated with interrupted 4-0 Biosyn sutures.  The skin edges were

approximated using metallic clips.

 

Dermabond applied.  Procedure terminated.

 

Needle and instrument count correct.

 

ESTIMATED BLOOD LOSS:  150 mL

 

SPECIMEN:  Portion of ventral hernia sac which was harvested from the 
subcutaneous

space.

 

DRAINS:  Two JPs.

 

IMPLANT:  Phasix mesh 24.5 cm and 30.5 cm, as well as Versatex monofilament mesh

which initially measured 50 x 50 cm in size, but was cut to the actual size of 
the

Phasix mesh prior to implantation.

 

The patient tolerated the procedure, and the procedure was terminated.

 

 

RICHI [id number]

DD: 07/26/2017 16:02

DT: 07/27/2017 12:59

Job #:  [id number]

MTDD

## 2017-07-28 VITALS — HEART RATE: 74 BPM | DIASTOLIC BLOOD PRESSURE: 49 MMHG | SYSTOLIC BLOOD PRESSURE: 106 MMHG

## 2017-07-28 VITALS — TEMPERATURE: 98.7 F

## 2017-07-28 RX ADMIN — BUDESONIDE AND FORMOTEROL FUMARATE DIHYDRATE SCH PUFF: 160; 4.5 AEROSOL RESPIRATORY (INHALATION) at 10:38

## 2017-07-28 RX ADMIN — ENOXAPARIN SODIUM SCH MG: 40 INJECTION SUBCUTANEOUS at 10:37

## 2017-08-01 NOTE — PATH
Surgical Pathology Report



Patient Name:  ORIN GORMAN

Accession #:  [id number]

Med. Rec. #:  [id number]                                                        

   /Age/Gender:  1970 (Age: 46) / F

Account:  [id number]                                                          

             Location: [facility] MED/SURG

Taken:  2017

Received:  2017

Reported:  2017

Physicians:  Pool Lozano M.D.

  



Specimen(s) Received

 PORTION OF VENTRAL HERNIA SAC 





Clinical History

Ventral hernia with obstruction







Final Diagnosis

PORTION OF HERNIA SAC, VENTRAL HERNIA REPAIR:

FOCALLY MESOTHELIUM LINED BENIGN FIBROMEMBRANOUS AND FIBROFATTY TISSUE WITH

FIBROUS SCAR, CHRONIC INFLAMMATION WITH FOREIGN BODY TYPE MULTINUCLEATED GIANT

CELL REACTION, FAT NECROSIS, CALCIFICATIONS.



Comment: Immunohistochemical stain for beta-catenin performed at [place], [place] ([id number]) and interpreted at [facility] shows nonspecific cytoplasmic staining, supporting the impression of

the fibrous scar. 





***Electronically Signed***

Alexander Finkelstein, M.D.





Gross Description

Received in formalin labelled "portion of ventral hernia sac" is a 13.5 x 11 x

1.5 cm portion of fibrous tissue with attached yellow adipose tissue. A 1.8 cm

greatest dimension firm and focally cystic area is present towards one edge of

the specimen. No other distinct lesions are identified. Representative sections

are submitted in 4 cassettes with sections from the firm area submitted in

cassettes 1-3, and an additional representative section submitted in cassette 4.

[facility]/2017



[facility]/2017

## 2018-03-02 ENCOUNTER — HOSPITAL ENCOUNTER (OUTPATIENT)
Dept: HOSPITAL 74 - JASU-ENDO | Age: 48
Discharge: HOME | End: 2018-03-02
Attending: INTERNAL MEDICINE
Payer: COMMERCIAL

## 2018-03-02 VITALS — TEMPERATURE: 97.5 F

## 2018-03-02 VITALS — BODY MASS INDEX: 33.1 KG/M2

## 2018-03-02 VITALS — HEART RATE: 73 BPM

## 2018-03-02 VITALS — DIASTOLIC BLOOD PRESSURE: 57 MMHG | SYSTOLIC BLOOD PRESSURE: 102 MMHG

## 2018-03-02 DIAGNOSIS — Z12.11: Primary | ICD-10-CM

## 2018-03-02 DIAGNOSIS — Z85.43: ICD-10-CM

## 2018-03-02 DIAGNOSIS — Z80.0: ICD-10-CM

## 2018-03-02 DIAGNOSIS — D12.2: ICD-10-CM

## 2018-03-02 DIAGNOSIS — K57.30: ICD-10-CM

## 2018-03-02 DIAGNOSIS — K64.8: ICD-10-CM

## 2018-03-02 DIAGNOSIS — Z85.3: ICD-10-CM

## 2018-03-02 DIAGNOSIS — K21.9: ICD-10-CM

## 2018-03-02 DIAGNOSIS — K44.9: ICD-10-CM

## 2018-03-02 PROCEDURE — 0DB38ZX EXCISION OF LOWER ESOPHAGUS, VIA NATURAL OR ARTIFICIAL OPENING ENDOSCOPIC, DIAGNOSTIC: ICD-10-PCS | Performed by: INTERNAL MEDICINE

## 2018-03-02 PROCEDURE — 0DBK8ZX EXCISION OF ASCENDING COLON, VIA NATURAL OR ARTIFICIAL OPENING ENDOSCOPIC, DIAGNOSTIC: ICD-10-PCS | Performed by: INTERNAL MEDICINE

## 2018-03-05 NOTE — PATH
Surgical Pathology Report



Patient Name:  ROIN GORMAN

Accession #:  [id number]

Med. Rec. #:  [id number]                                                        

   /Age/Gender:  1970 (Age: 47) / F

Account:  [id number]                                                          

             Location: [facility]END[facility]

Taken:  3/2/2018

Received:  3/2/2018

Reported:  3/5/2018

Physicians:  Pasha Damon M.D.

  



Specimen(s) Received

A: BX DUODENUM 2ND PORTION AND BULB 

B: BX GASTRIC ANTRUM 

C: BX ESOPHAGUS 

D: ASCENDING COLON POLYP 





Clinical History

Preoperative diagnosis: Colonic polyps, GERD, family history of colon cancer,

BRCA1 gene mutation positive finding

Postoperative diagnosis: Hiatal hernia, diverticulosis, colon polyps







Final Diagnosis

A. DUODENUM, SECOND PORTION/BULB, BIOPSY:

DUODENAL MUCOSA WITH MILD CHRONIC DUODENITIS AND BRUNNER'S GLAND HYPERPLASIA.



B. STOMACH, ANTRUM, BIOPSY:

GASTRIC ANTRAL MUCOSA WITH MILD CHRONIC GASTRITIS. IMMUNOHISTOCHEMICAL STAIN FOR

H. PYLORI IS NEGATIVE.



C. DISTAL ESOPHAGUS, BIOPSY:

SQUAMOUS MUCOSA WITH VASCULAR CONGESTION AND BASAL CELL HYPERPLASIA CONSISTENT

WITH MODERATE TO SEVERE REFLUX TYPE ESOPHAGITIS.



D. ASCENDING COLON, POLYPS, BIOPSY:

TUBULAR ADENOMA(S).





***Electronically Signed***

Gracy Moreno M.D.





Gross Description

A.  Received in formalin, labeled "biopsy duodenum second portion"" are 3 tan,

irregular portions of soft tissue ranging from 0.3-0.4 cm. in greatest

dimension. The specimens are submitted in toto in one cassette.



B.  Received in formalin, labeled "biopsy gastric body, antrum" are 8 tan,

irregular portions of soft tissue ranging from 0.2-0.6 cm. in greatest

dimension. The specimens are submitted in toto in one cassette.



C.  Received in formalin, labeled "biopsy distal esophagus" is a tan, irregular

portion of soft tissue measuring 0.4 cm. in greatest dimension. The specimen is

submitted in toto in one cassette.



D.  Received in formalin, labeled "biopsy polyps ascending colon" are 4 tan,

irregular portions of soft tissue ranging from 0.1-0.3 cm. in greatest

dimension. The specimens are submitted in toto in one cassette.

/3/2/2018



saudi/3/2/2018

## 2019-02-19 ENCOUNTER — HOSPITAL ENCOUNTER (EMERGENCY)
Dept: HOSPITAL 74 - FER | Age: 49
Discharge: HOME | End: 2019-02-19
Payer: COMMERCIAL

## 2019-02-19 VITALS — SYSTOLIC BLOOD PRESSURE: 105 MMHG | DIASTOLIC BLOOD PRESSURE: 57 MMHG | HEART RATE: 70 BPM

## 2019-02-19 VITALS — BODY MASS INDEX: 33.6 KG/M2

## 2019-02-19 VITALS — TEMPERATURE: 98.6 F

## 2019-02-19 DIAGNOSIS — R05: ICD-10-CM

## 2019-02-19 DIAGNOSIS — R07.89: Primary | ICD-10-CM

## 2019-02-19 LAB
ALBUMIN SERPL-MCNC: 4.3 G/DL (ref 3.4–5)
ALP SERPL-CCNC: 58 U/L (ref 45–117)
ALT SERPL-CCNC: 24 U/L (ref 13–61)
ANION GAP SERPL CALC-SCNC: 12 MMOL/L (ref 8–16)
AST SERPL-CCNC: 31 U/L (ref 15–37)
BASOPHILS # BLD: 0.6 % (ref 0–2)
BILIRUB SERPL-MCNC: 1.2 MG/DL (ref 0.2–1)
BUN SERPL-MCNC: 15 MG/DL (ref 7–18)
CALCIUM SERPL-MCNC: 9.2 MG/DL (ref 8.5–10)
CHLORIDE SERPL-SCNC: 93 MMOL/L (ref 98–107)
CO2 SERPL-SCNC: 29 MMOL/L (ref 21–32)
CREAT SERPL-MCNC: 1.2 MG/DL (ref 0.55–1.3)
DEPRECATED RDW RBC AUTO: 16.3 % (ref 11.6–15.6)
EOSINOPHIL # BLD: 3.7 % (ref 0–4.5)
GLUCOSE SERPL-MCNC: 89 MG/DL (ref 74–106)
HCT VFR BLD CALC: 36 % (ref 32.4–45.2)
HGB BLD-MCNC: 12.2 GM/DL (ref 10.7–15.3)
INR BLD: 1.16 (ref 0.82–1.09)
LYMPHOCYTES # BLD: 36.6 % (ref 8–40)
MAGNESIUM SERPL-MCNC: 1.1 MG/DL (ref 1.8–2.4)
MCH RBC QN AUTO: 37.3 PG (ref 25.7–33.7)
MCHC RBC AUTO-ENTMCNC: 33.9 G/DL (ref 32–36)
MCV RBC: 109.9 FL (ref 80–96)
MONOCYTES # BLD AUTO: 6.1 % (ref 3.8–10.2)
NEUTROPHILS # BLD: 53 % (ref 42.8–82.8)
PLATELET # BLD AUTO: 198 K/MM3 (ref 134–434)
PMV BLD: 8.9 FL (ref 7.5–11.1)
POTASSIUM SERPLBLD-SCNC: 3.5 MMOL/L (ref 3.5–5.1)
PROT SERPL-MCNC: 6.8 G/DL (ref 6.4–8.2)
PT PNL PPP: 12.9 SEC (ref 10.2–13)
RBC # BLD AUTO: 3.28 M/MM3 (ref 3.6–5.2)
SODIUM SERPL-SCNC: 134 MMOL/L (ref 136–145)
WBC # BLD AUTO: 5.1 K/MM3 (ref 4–10.8)

## 2019-02-19 PROCEDURE — 3E0337Z INTRODUCTION OF ELECTROLYTIC AND WATER BALANCE SUBSTANCE INTO PERIPHERAL VEIN, PERCUTANEOUS APPROACH: ICD-10-PCS

## 2019-02-19 NOTE — PDOC
History of Present Illness





- General


Chief Complaint: Pain, Acute


Stated Complaint: RIGHT CHEST PAIN


Time Seen by Provider: 02/19/19 13:42


History Source: Patient, Old Records


Exam Limitations: No Limitations





- History of Present Illness


Initial Comments: 





02/19/19 14:21


48-year-old female with history of breast CA in remission (status post 

bilateral mastectomy with implants, chemotherapy and radiation in the past 

complicated by mildly compromised EF of 40% secondary to Adriamycin), ovarian 

CA in 2017 status post TJ/BSO with chemotherapy now in remission with CAT scan 

last month showing no evidence of metastatic disease, presents now with cough 

and right-sided chest pain. Patient had URI symptoms of nasal congestion and 

cough about 2 weeks ago, treated with antibiotics and symptoms significantly 

improved but still with residual dry cough. Over the last 3 or 4 days, has 

noted a very pinpoint and sharp pain in the right lower parasternal region that 

occurs specifically when she coughs or expands her chest or twists her body. 

There was no injury, no bruising or rash, no associated fevers or chills or 

orthopnea, no dyspnea on exertion or palpitations or shortness of breath. She 

has been taking Motrin with temporary improvement in the symptoms. Saw her PCP 

today and concern was raised for pulmonary embolism given the history, so she 

was referred to the emergency department.





No recent travel, last surgery was in July 2018 for hernia repair, no DVT 

symptoms, no personal or family history of PE or DVT. Is BRCA 1 positive. Not 

on hormone replacement therapy. Not a smoker.





Past History





- Past Medical History


Allergies/Adverse Reactions: 


 Allergies











Allergy/AdvReac Type Severity Reaction Status Date / Time


 


piperacillin sodium Allergy Severe Hives Verified 02/19/19 13:42





[From Zosyn]     


 


tazobactam sodium Allergy Severe Hives Verified 02/19/19 13:42





[From Zosyn]     


 


docetaxel [From Taxotere] Allergy   Verified 02/19/19 13:42


 


PROLINE SUTURES Allergy Severe  Uncoded 02/19/19 13:42











Home Medications: 


Ambulatory Orders





Nebivolol HCl [Bystolic] 5 mg PO HS 05/09/16 


Ramipril 2.5 mg PO HS 11/04/16 


Budesonide/Formeterol Fumarate [SYMBICORT 160/4.5mcg -] 1 inh PO BID PRN 03/02/ 17 


Ranitidine HCl [Zantac] 150 mg PO PRN 03/02/17 


Olaparib [Lynparza] 300 mg PO BID 03/02/18 








Anemia: No


Asthma: Yes


Cancer: Yes (BREAST CA,ovarian ca stage 3)


Cardiac Disorders: No


CVA: No


COPD: No


CHF: Yes (POST CHEMO CHF EF 40%)


Dementia: No


Diabetes: No


GI Disorders: Yes (GERD, COLON POLYPS)


 Disorders: No


HTN: No


Hypercholesterolemia: No


Liver Disease: No


Seizures: No


Thyroid Disease: No





- Surgical History


Abdominal Surgery: Yes (incarcerated hernia, MESH UMBILICAL HERNIA REPAIR 7/17)


Appendectomy: No


Cardiac Surgery: No


Cholecystectomy: No


Lung Surgery: No


Neurologic Surgery: No


Orthopedic Surgery: Yes (LUMBAR LAMINECTOMY)





- Suicide/Smoking/Psychosocial Hx


Smoking History: Former smoker


Have you smoked in the past 12 months: No


If you are a former smoker, when did you quit?: 2 YEARS


Information on smoking cessation initiated: No


'Breaking Loose' booklet given: 05/09/16


Hx Alcohol Use: Yes (OCASIONAL)


Drug/Substance Use Hx: No


Substance Use Type: None


Hx Substance Use Treatment: No





**Review of Systems





- Review of Systems


Constitutional: No: Chills, Fever, Night Sweats, Unintentional Wgt. Loss


Respiratory: Yes: Cough, Wheezing (occasional wheezing from radiation).  No: 

Shortness of Breath


Cardiac (ROS): Yes: See HPI.  No: Edema, Palpitations, Syncope


ABD/GI: No: Diarrhea, Nausea, Vomiting


Neurological: No: Headache


All Other Systems: Reviewed and Negative





*Physical Exam





- Vital Signs


 Last Vital Signs











Temp Pulse Resp BP Pulse Ox


 


 98.6 F   64   17   148/87   99 


 


 02/19/19 13:42  02/19/19 13:42  02/19/19 13:42  02/19/19 13:42  02/19/19 13:42














- Physical Exam


Comments: 





02/19/19 14:26


Vital signs normal, O2 sat 99% on room air


GENERAL: The patient is awake, alert, and fully oriented, in no acute distress 

speaking full sentences


HEAD: Normal with no signs of trauma.


EYES: PERRL, EOMI, conjunctiva clear.


ENT: Moist mucous membranes.


NECK: Normal range of motion, supple.


CHEST: no rash/shingles or bruising. Reproducible ttp lower R parasternal 

border without crepitus or step-off/deformity.


LUNGS: Breath sounds equal, clear to auscultation bilaterally. Scant wheeze R 

base, no crackles.


HEART: Regular rate and rhythm, normal S1 and S2 without murmur or rub.


ABDOMEN: Soft/nontender/nondistended. BS wnl.  No guarding or rebound.  


EXTREMITIES: Normal range of motion, no edema or calf tenderness. 2+ distal 

pulses.


NEUROLOGICAL: Cranial nerves II through XII grossly intact.  Normal speech, 

normal gait


PSYCH: Normal mood, normal affect.


SKIN: Warm, Dry, no rashes or lesions noted.





Moderate Sedation





- Procedure Monitoring


Vital Signs: 


Procedure Monitoring Vital Signs











Temperature  98.6 F   02/19/19 13:42


 


Pulse Rate  64   02/19/19 13:42


 


Respiratory Rate  17   02/19/19 13:42


 


Blood Pressure  148/87   02/19/19 13:42


 


O2 Sat by Pulse Oximetry (%)  99   02/19/19 13:42











**Heart Score/ECG Review


  ** #1


ECG reviewed & interpreted by me at: 14:36


General ECG Interpretation: Sinus Rhythm, Normal Rate (70), Normal Intervals (

qtc 464, qrs 128 with LBBB), No acute ischemic changes


Compared to previous ECG there are: No significant change (prior EKG on file 

from 11/16 showed L axis deviation but normal QRS. This has progressed without 

acute ischemic abnormality)





ED Treatment Course





- LABORATORY


CBC & Chemistry Diagram: 


 02/19/19 14:06





 02/19/19 14:06





- RADIOLOGY


Radiology Studies Ordered: 














 Category Date Time Status


 


 CHEST CTA [CT] Stat CT Scan  02/19/19 13:43 Ordered














Medical Decision Making





- Medical Decision Making





02/19/19 14:30


48-year-old female with history of breast and ovarian CA now with localized 

right chest pain for 3 days in the setting of URI/cough, sent here for concern 

for PE. Vital signs are normal, history is the primary  behind risk 

factors/clinical suspicion for PE. Otherwise, presentation seems most 

consistent with musculoskeletal/costochondral pain or strain from cough.


Labs including troponin and EKG


CTA chest, creatinine 1.2 in January and historically normal


Reassess, will discuss disposition with Dr. Hicks, patient's PCP.





02/19/19 14:53


labs wnl, including troponin. 


EKG with evolved L axis deviation and now LBBB without acute ischemic change. 

morphology similar to 2016, is followed by Dr. Cortez. 


CTA without PE or acute lung pathology/nodule. 


Continues to feel well, will f/u with Dr. Arcos. Continue nsaids prn for pain

, expect resolution of sxs over next few days. Understands return criteria. 








*DC/Admit/Observation/Transfer


Diagnosis at time of Disposition: 


 Costochondral chest pain








- Discharge Dispostion


Disposition: HOME


Condition at time of disposition: Stable





- Referrals


Referrals: 


Olga Lidia Hicks [Primary Care Provider] - 


Yary Cortez MD [Staff Physician] - 





- Patient Instructions


Printed Discharge Instructions:  DI for Costochondritis, DI for Muscle Strain


Additional Instructions: 


Activity as tolerated. Stay hydrated. 





Blood tests and a CAT scan of the chest showed no acute abnormalities, 

including no PE or pneumonia. An EKG looks similar to 2016, but has advanced to 

a left bundle branch block so you should follow up with your cardiologist.





As discussed, the pain is likely due to a strain of the cartilage and/or 

muscles around the ribs and sternum, likely from coughing. 


Tylenol 1000 mg every 8 hours and/or ibuprofen 600 mg every 8 hours as needed 

for pain. The symptoms should get better over the next few days. 





Continue your medications as previously prescribed by your physician.





You should follow up with Dr. Hicks and Dr. Cortez as soon as possible 

regarding today's emergency department visit.





Return to the emergency department for any new or concerning symptoms, 

particularly persistent or worsening pain, shortness of breath or palpitations, 

rash or discoloration or swelling, fever or chills. 





- Post Discharge Activity

## 2019-02-21 NOTE — EKG
Test Reason : 

Blood Pressure : ***/*** mmHG

Vent. Rate : 070 BPM     Atrial Rate : 070 BPM

   P-R Int : 166 ms          QRS Dur : 128 ms

    QT Int : 430 ms       P-R-T Axes : 033 -39 087 degrees

   QTc Int : 464 ms

 

NORMAL SINUS RHYTHM

LEFT AXIS DEVIATION

LEFT BUNDLE BRANCH BLOCK

ABNORMAL ECG

WHEN COMPARED WITH ECG OF 01-NOV-2016 14:22,

LEFT BUNDLE BRANCH BLOCK IS NOW PRESENT

Confirmed by ENEIDA ZAMUDIO, KYLEIGH ([id number]) on 2/21/2019 11:59:49 AM

 

Referred By: LIBAN GUERRIER           Confirmed By:KYLEIGH MONIQUE MD

## 2020-01-23 NOTE — DS
DATE OF ADMISSION:  07/26/2017

 

DATE OF DISCHARGE:  07/28/2017

 

ADMITTING DIAGNOSIS:  Complex, incarcerated incisional hernia.

 

DISCHARGE DIAGNOSES:

1.  Complex, incarcerated incisional hernia.

2.  Patient also had pre-existing chronic obstructive pulmonary disease and

hypertension.

 

BRIEF HISTORY:  This is a 46-year-old female who was admitted to [facility] on July 26 for surgical management of a chronically incarcerated incisional

hernia.  She underwent a repair of this hernia utilizing mesh, component separation,

and myofascial release.  Please see Dr. Lozano's operative report for details. 

Postoperatively, she required a narcotic pain medication for postoperative pain.  She

took her usual home medications, and she was on a liquid diet.  She is being

discharged home today, July 28, tolerating liquids.  She is ambulating and voiding. 

Her pain is well controlled with oral narcotics.

 

On exam, her abdomen is soft, nontender, and her incision is clean.  She will go home

with a binder and with 2 Pawan-Lucia drains which she will empty daily and record

the amount.  She will follow up with Dr. Lozano next week to be evaluated for drain

removal.  She has a new prescription for Percocet which she will take as needed for

pain, and she will continue her usual home medications of Bystolic, Ramipril,

Symbicort, and Zantac.

 

 

DO PRATIMA [id number]

DD: 07/28/2017 11:23

DT: 07/29/2017 13:08

Job #:  [id number] Pt is returning call, please call her back at [phone]

## 2021-04-03 ENCOUNTER — HOSPITAL ENCOUNTER (INPATIENT)
Dept: HOSPITAL 74 - FER | Age: 51
LOS: 2 days | Discharge: HOME | DRG: 389 | End: 2021-04-05
Attending: SPECIALIST | Admitting: SPECIALIST
Payer: COMMERCIAL

## 2021-04-03 VITALS — BODY MASS INDEX: 0.4 KG/M2

## 2021-04-03 DIAGNOSIS — I50.9: ICD-10-CM

## 2021-04-03 DIAGNOSIS — Z85.3: ICD-10-CM

## 2021-04-03 DIAGNOSIS — J45.909: ICD-10-CM

## 2021-04-03 DIAGNOSIS — K56.51: Primary | ICD-10-CM

## 2021-04-03 DIAGNOSIS — I42.9: ICD-10-CM

## 2021-04-03 LAB
ALBUMIN SERPL-MCNC: 4.4 G/DL (ref 3.4–5)
ALP SERPL-CCNC: 72 U/L (ref 45–117)
ALT SERPL-CCNC: 23 U/L (ref 13–61)
ANION GAP SERPL CALC-SCNC: 7 MMOL/L (ref 8–16)
ANISOCYTOSIS BLD QL: (no result)
AST SERPL-CCNC: 25 U/L (ref 15–37)
BILIRUB SERPL-MCNC: 1 MG/DL (ref 0.2–1)
BUN SERPL-MCNC: 20 MG/DL (ref 7–18)
CALCIUM SERPL-MCNC: 9.4 MG/DL (ref 8.5–10)
CHLORIDE SERPL-SCNC: 100 MMOL/L (ref 98–107)
CO2 SERPL-SCNC: 25 MMOL/L (ref 21–32)
CREAT SERPL-MCNC: 1 MG/DL (ref 0.55–1.3)
DEPRECATED RDW RBC AUTO: 16.8 % (ref 11.6–15.6)
GLUCOSE SERPL-MCNC: 123 MG/DL (ref 74–106)
HCT VFR BLD CALC: 40 % (ref 32.4–45.2)
HGB BLD-MCNC: 13.4 GM/DL (ref 10.7–15.3)
LIPASE SERPL-CCNC: 146 U/L (ref 73–393)
MCH RBC QN AUTO: 36.3 PG (ref 25.7–33.7)
MCHC RBC AUTO-ENTMCNC: 33.4 G/DL (ref 32–36)
MCV RBC: 108.5 FL (ref 80–96)
PLATELET # BLD AUTO: 214 K/MM3 (ref 134–434)
PLATELET BLD QL SMEAR: ADEQUATE
PMV BLD: 9.3 FL (ref 7.5–11.1)
POTASSIUM SERPLBLD-SCNC: 4 MMOL/L (ref 3.5–5.1)
PROT SERPL-MCNC: 7.2 G/DL (ref 6.4–8.2)
RBC # BLD AUTO: 3.69 M/MM3 (ref 3.6–5.2)
SODIUM SERPL-SCNC: 132 MMOL/L (ref 136–145)
WBC # BLD AUTO: 9.8 K/MM3 (ref 4–10.8)

## 2021-04-03 PROCEDURE — U0005 INFEC AGEN DETEC AMPLI PROBE: HCPCS

## 2021-04-03 PROCEDURE — C9803 HOPD COVID-19 SPEC COLLECT: HCPCS

## 2021-04-03 PROCEDURE — U0003 INFECTIOUS AGENT DETECTION BY NUCLEIC ACID (DNA OR RNA); SEVERE ACUTE RESPIRATORY SYNDROME CORONAVIRUS 2 (SARS-COV-2) (CORONAVIRUS DISEASE [COVID-19]), AMPLIFIED PROBE TECHNIQUE, MAKING USE OF HIGH THROUGHPUT TECHNOLOGIES AS DESCRIBED BY CMS-2020-01-R: HCPCS

## 2021-04-03 RX ADMIN — RAMIPRIL SCH: 5 CAPSULE ORAL at 21:47

## 2021-04-03 RX ADMIN — NEBIVOLOL HYDROCHLORIDE SCH: 5 TABLET ORAL at 21:47

## 2021-04-03 RX ADMIN — HEPARIN SODIUM SCH UNIT: 5000 INJECTION, SOLUTION INTRAVENOUS; SUBCUTANEOUS at 21:48

## 2021-04-03 RX ADMIN — POTASSIUM CHLORIDE SCH MLS/HR: 149 INJECTION, SOLUTION, CONCENTRATE INTRAVENOUS at 15:30

## 2021-04-03 RX ADMIN — ROSUVASTATIN CALCIUM SCH MG: 5 TABLET, FILM COATED ORAL at 21:47

## 2021-04-03 RX ADMIN — ONDANSETRON PRN MG: 2 INJECTION INTRAMUSCULAR; INTRAVENOUS at 17:49

## 2021-04-03 RX ADMIN — Medication SCH MG: at 22:36

## 2021-04-04 LAB
ALBUMIN SERPL-MCNC: 3.5 G/DL (ref 3.4–5)
ALP SERPL-CCNC: 56 U/L (ref 45–117)
ALT SERPL-CCNC: 19 U/L (ref 13–61)
ANION GAP SERPL CALC-SCNC: 8 MMOL/L (ref 8–16)
AST SERPL-CCNC: 17 U/L (ref 15–37)
BASOPHILS # BLD: 3.8 % (ref 0–2)
BILIRUB SERPL-MCNC: 0.8 MG/DL (ref 0.2–1)
BUN SERPL-MCNC: 11 MG/DL (ref 7–18)
CALCIUM SERPL-MCNC: 8.3 MG/DL (ref 8.5–10)
CHLORIDE SERPL-SCNC: 101 MMOL/L (ref 98–107)
CO2 SERPL-SCNC: 28 MMOL/L (ref 21–32)
CREAT SERPL-MCNC: 1 MG/DL (ref 0.55–1.3)
DEPRECATED RDW RBC AUTO: 16.4 % (ref 11.6–15.6)
EOSINOPHIL # BLD: 1.9 % (ref 0–4.5)
GLUCOSE SERPL-MCNC: 101 MG/DL (ref 74–106)
HCT VFR BLD CALC: 34.5 % (ref 32.4–45.2)
HGB BLD-MCNC: 11.3 GM/DL (ref 10.7–15.3)
LYMPHOCYTES # BLD: 20.9 % (ref 8–40)
MCH RBC QN AUTO: 35.8 PG (ref 25.7–33.7)
MCHC RBC AUTO-ENTMCNC: 32.8 G/DL (ref 32–36)
MCV RBC: 109.3 FL (ref 80–96)
MONOCYTES # BLD AUTO: 5.7 % (ref 3.8–10.2)
NEUTROPHILS # BLD: 67.7 % (ref 42.8–82.8)
PLATELET # BLD AUTO: 181 K/MM3 (ref 134–434)
PMV BLD: 8.8 FL (ref 7.5–11.1)
POTASSIUM SERPLBLD-SCNC: 3.4 MMOL/L (ref 3.5–5.1)
PROT SERPL-MCNC: 5.8 G/DL (ref 6.4–8.2)
RBC # BLD AUTO: 3.15 M/MM3 (ref 3.6–5.2)
SODIUM SERPL-SCNC: 137 MMOL/L (ref 136–145)
WBC # BLD AUTO: 5.4 K/MM3 (ref 4–10.8)

## 2021-04-04 RX ADMIN — ONDANSETRON PRN MG: 2 INJECTION INTRAMUSCULAR; INTRAVENOUS at 21:57

## 2021-04-04 RX ADMIN — POTASSIUM CHLORIDE SCH MLS/HR: 149 INJECTION, SOLUTION, CONCENTRATE INTRAVENOUS at 15:43

## 2021-04-04 RX ADMIN — PANTOPRAZOLE SODIUM SCH MG: 40 INJECTION, POWDER, FOR SOLUTION INTRAVENOUS at 09:44

## 2021-04-04 RX ADMIN — Medication SCH MG: at 10:14

## 2021-04-04 RX ADMIN — HEPARIN SODIUM SCH UNIT: 5000 INJECTION, SOLUTION INTRAVENOUS; SUBCUTANEOUS at 22:00

## 2021-04-04 RX ADMIN — MORPHINE SULFATE PRN MG: 2 INJECTION, SOLUTION INTRAMUSCULAR; INTRAVENOUS at 12:12

## 2021-04-04 RX ADMIN — ROSUVASTATIN CALCIUM SCH MG: 5 TABLET, FILM COATED ORAL at 21:59

## 2021-04-04 RX ADMIN — POTASSIUM CHLORIDE SCH MLS/HR: 149 INJECTION, SOLUTION, CONCENTRATE INTRAVENOUS at 20:00

## 2021-04-04 RX ADMIN — HEPARIN SODIUM SCH UNIT: 5000 INJECTION, SOLUTION INTRAVENOUS; SUBCUTANEOUS at 09:44

## 2021-04-04 RX ADMIN — ONDANSETRON PRN MG: 2 INJECTION INTRAMUSCULAR; INTRAVENOUS at 12:11

## 2021-04-04 RX ADMIN — RAMIPRIL SCH MG: 5 CAPSULE ORAL at 21:59

## 2021-04-04 RX ADMIN — MORPHINE SULFATE PRN MG: 2 INJECTION, SOLUTION INTRAMUSCULAR; INTRAVENOUS at 22:36

## 2021-04-04 RX ADMIN — Medication SCH MG: at 22:32

## 2021-04-04 RX ADMIN — MAGNESIUM SULFATE HEPTAHYDRATE SCH GM: 500 INJECTION, SOLUTION INTRAMUSCULAR; INTRAVENOUS at 09:43

## 2021-04-04 RX ADMIN — NEBIVOLOL HYDROCHLORIDE SCH MG: 5 TABLET ORAL at 22:00

## 2021-04-05 VITALS — HEART RATE: 65 BPM | DIASTOLIC BLOOD PRESSURE: 60 MMHG | SYSTOLIC BLOOD PRESSURE: 109 MMHG

## 2021-04-05 VITALS — TEMPERATURE: 98.7 F

## 2021-04-05 LAB
ALBUMIN SERPL-MCNC: 3.4 G/DL (ref 3.4–5)
ALP SERPL-CCNC: 53 U/L (ref 45–117)
ALT SERPL-CCNC: 17 U/L (ref 13–61)
ANION GAP SERPL CALC-SCNC: 8 MMOL/L (ref 8–16)
AST SERPL-CCNC: 19 U/L (ref 15–37)
BASOPHILS # BLD: 3.4 % (ref 0–2)
BILIRUB SERPL-MCNC: 0.9 MG/DL (ref 0.2–1)
BUN SERPL-MCNC: 9 MG/DL (ref 7–18)
CALCIUM SERPL-MCNC: 8.4 MG/DL (ref 8.5–10)
CHLORIDE SERPL-SCNC: 101 MMOL/L (ref 98–107)
CO2 SERPL-SCNC: 28 MMOL/L (ref 21–32)
CREAT SERPL-MCNC: 1 MG/DL (ref 0.55–1.3)
DEPRECATED RDW RBC AUTO: 16.3 % (ref 11.6–15.6)
EOSINOPHIL # BLD: 1.9 % (ref 0–4.5)
GLUCOSE SERPL-MCNC: 103 MG/DL (ref 74–106)
HCT VFR BLD CALC: 33.2 % (ref 32.4–45.2)
HGB BLD-MCNC: 11.3 GM/DL (ref 10.7–15.3)
LYMPHOCYTES # BLD: 18.2 % (ref 8–40)
MAGNESIUM SERPL-MCNC: 1.1 MG/DL (ref 1.8–2.4)
MCH RBC QN AUTO: 37 PG (ref 25.7–33.7)
MCHC RBC AUTO-ENTMCNC: 33.9 G/DL (ref 32–36)
MCV RBC: 109.3 FL (ref 80–96)
MONOCYTES # BLD AUTO: 5.9 % (ref 3.8–10.2)
NEUTROPHILS # BLD: 70.6 % (ref 42.8–82.8)
PLATELET # BLD AUTO: 172 K/MM3 (ref 134–434)
PMV BLD: 9.1 FL (ref 7.5–11.1)
POTASSIUM SERPLBLD-SCNC: 3.5 MMOL/L (ref 3.5–5.1)
PROT SERPL-MCNC: 5.6 G/DL (ref 6.4–8.2)
RBC # BLD AUTO: 3.04 M/MM3 (ref 3.6–5.2)
SODIUM SERPL-SCNC: 137 MMOL/L (ref 136–145)
WBC # BLD AUTO: 5.9 K/MM3 (ref 4–10.8)

## 2021-04-05 RX ADMIN — POTASSIUM CHLORIDE SCH MLS/HR: 149 INJECTION, SOLUTION, CONCENTRATE INTRAVENOUS at 16:39

## 2021-04-05 RX ADMIN — Medication SCH MG: at 10:14

## 2021-04-05 RX ADMIN — PANTOPRAZOLE SODIUM SCH MG: 40 INJECTION, POWDER, FOR SOLUTION INTRAVENOUS at 09:31

## 2021-04-05 RX ADMIN — MAGNESIUM SULFATE HEPTAHYDRATE SCH GM: 500 INJECTION, SOLUTION INTRAMUSCULAR; INTRAVENOUS at 09:31

## 2021-04-05 RX ADMIN — POTASSIUM CHLORIDE SCH MLS/HR: 149 INJECTION, SOLUTION, CONCENTRATE INTRAVENOUS at 06:36

## 2021-04-05 RX ADMIN — HEPARIN SODIUM SCH UNIT: 5000 INJECTION, SOLUTION INTRAVENOUS; SUBCUTANEOUS at 09:30

## 2021-05-14 ENCOUNTER — HOSPITAL ENCOUNTER (OUTPATIENT)
Dept: HOSPITAL 74 - JASU-ENDO | Age: 51
Discharge: HOME | End: 2021-05-14
Attending: INTERNAL MEDICINE
Payer: COMMERCIAL

## 2021-05-14 VITALS — BODY MASS INDEX: 33.1 KG/M2

## 2021-05-14 VITALS — TEMPERATURE: 97.5 F

## 2021-05-14 VITALS — SYSTOLIC BLOOD PRESSURE: 94 MMHG | HEART RATE: 61 BPM | DIASTOLIC BLOOD PRESSURE: 51 MMHG

## 2021-05-14 DIAGNOSIS — K57.30: ICD-10-CM

## 2021-05-14 DIAGNOSIS — K29.70: ICD-10-CM

## 2021-05-14 DIAGNOSIS — Z12.11: Primary | ICD-10-CM

## 2021-05-14 DIAGNOSIS — K21.9: ICD-10-CM

## 2021-05-14 DIAGNOSIS — Z87.19: ICD-10-CM

## 2021-05-14 DIAGNOSIS — Z86.010: ICD-10-CM

## 2021-05-14 DIAGNOSIS — Z80.0: ICD-10-CM

## 2021-05-14 PROCEDURE — 43239 EGD BIOPSY SINGLE/MULTIPLE: CPT

## 2021-05-14 PROCEDURE — 0DB98ZX EXCISION OF DUODENUM, VIA NATURAL OR ARTIFICIAL OPENING ENDOSCOPIC, DIAGNOSTIC: ICD-10-PCS | Performed by: INTERNAL MEDICINE

## 2021-05-14 PROCEDURE — 0DB78ZX EXCISION OF STOMACH, PYLORUS, VIA NATURAL OR ARTIFICIAL OPENING ENDOSCOPIC, DIAGNOSTIC: ICD-10-PCS | Performed by: INTERNAL MEDICINE

## 2021-05-14 PROCEDURE — 0DJD8ZZ INSPECTION OF LOWER INTESTINAL TRACT, VIA NATURAL OR ARTIFICIAL OPENING ENDOSCOPIC: ICD-10-PCS | Performed by: INTERNAL MEDICINE

## 2023-12-08 ENCOUNTER — OFFICE (OUTPATIENT)
Dept: URBAN - METROPOLITAN AREA CLINIC 121 | Facility: CLINIC | Age: 53
Setting detail: OPHTHALMOLOGY
End: 2023-12-08
Payer: COMMERCIAL

## 2023-12-08 ENCOUNTER — RX ONLY (RX ONLY)
Age: 53
End: 2023-12-08

## 2023-12-08 DIAGNOSIS — H10.45: ICD-10-CM

## 2023-12-08 DIAGNOSIS — H16.223: ICD-10-CM

## 2023-12-08 DIAGNOSIS — H40.023: ICD-10-CM

## 2023-12-08 DIAGNOSIS — H35.013: ICD-10-CM

## 2023-12-08 PROCEDURE — 92014 COMPRE OPH EXAM EST PT 1/>: CPT | Performed by: OPHTHALMOLOGY

## 2023-12-08 PROCEDURE — 92250 FUNDUS PHOTOGRAPHY W/I&R: CPT | Performed by: OPHTHALMOLOGY

## 2023-12-08 ASSESSMENT — REFRACTION_AUTOREFRACTION
OD_SPHERE: +0.75
OD_AXIS: 010
OS_SPHERE: +1.00
OS_CYLINDER: +1.00
OD_CYLINDER: +1.00
OS_AXIS: 140

## 2023-12-08 ASSESSMENT — SPHEQUIV_DERIVED
OS_SPHEQUIV: 0.875
OS_SPHEQUIV: 1.5
OD_SPHEQUIV: 0.875
OD_SPHEQUIV: 1.25

## 2023-12-08 ASSESSMENT — CONFRONTATIONAL VISUAL FIELD TEST (CVF)
OS_FINDINGS: FULL
OD_FINDINGS: FULL

## 2023-12-08 ASSESSMENT — REFRACTION_MANIFEST
OS_ADD: +1.25
OS_CYLINDER: +0.25
OD_SPHERE: +0.75
OS_AXIS: 180
OD_CYLINDER: +0.25
OD_ADD: +1.25
OS_SPHERE: +0.75
OD_AXIS: 180

## 2023-12-08 ASSESSMENT — SUPERFICIAL PUNCTATE KERATITIS (SPK)
OS_SPK: 1+
OD_SPK: 1+

## 2023-12-15 ENCOUNTER — OFFICE (OUTPATIENT)
Dept: URBAN - METROPOLITAN AREA CLINIC 121 | Facility: CLINIC | Age: 53
Setting detail: OPHTHALMOLOGY
End: 2023-12-15
Payer: COMMERCIAL

## 2023-12-15 DIAGNOSIS — H16.223: ICD-10-CM

## 2023-12-15 DIAGNOSIS — H40.023: ICD-10-CM

## 2023-12-15 DIAGNOSIS — H43.392: ICD-10-CM

## 2023-12-15 DIAGNOSIS — H35.013: ICD-10-CM

## 2023-12-15 PROBLEM — H10.45 ALLERGIC CONJUNCTIVITIS ; BOTH EYES: Status: RESOLVED | Noted: 2023-12-08 | Resolved: 2023-12-15

## 2023-12-15 PROCEDURE — 92083 EXTENDED VISUAL FIELD XM: CPT | Performed by: OPHTHALMOLOGY

## 2023-12-15 PROCEDURE — 92012 INTRM OPH EXAM EST PATIENT: CPT | Performed by: OPHTHALMOLOGY

## 2023-12-15 PROCEDURE — 92133 CPTRZD OPH DX IMG PST SGM ON: CPT | Performed by: OPHTHALMOLOGY

## 2023-12-15 ASSESSMENT — SUPERFICIAL PUNCTATE KERATITIS (SPK)
OD_SPK: 1+
OS_SPK: 1+

## 2023-12-15 ASSESSMENT — REFRACTION_MANIFEST
OD_ADD: +1.25
OD_CYLINDER: +0.25
OS_ADD: +1.25
OS_AXIS: 180
OD_AXIS: 180
OD_SPHERE: +0.75
OS_CYLINDER: +0.25
OS_SPHERE: +0.75

## 2023-12-15 ASSESSMENT — REFRACTION_AUTOREFRACTION
OS_CYLINDER: +1.00
OD_SPHERE: +0.75
OS_AXIS: 140
OS_SPHERE: +1.00
OD_CYLINDER: +1.00
OD_AXIS: 010

## 2023-12-15 ASSESSMENT — SPHEQUIV_DERIVED
OD_SPHEQUIV: 1.25
OD_SPHEQUIV: 0.875
OS_SPHEQUIV: 1.5
OS_SPHEQUIV: 0.875

## 2023-12-15 ASSESSMENT — CONFRONTATIONAL VISUAL FIELD TEST (CVF)
OS_FINDINGS: FULL
OD_FINDINGS: FULL

## 2024-01-12 ENCOUNTER — OFFICE (OUTPATIENT)
Dept: URBAN - METROPOLITAN AREA CLINIC 121 | Facility: CLINIC | Age: 54
Setting detail: OPHTHALMOLOGY
End: 2024-01-12
Payer: COMMERCIAL

## 2024-01-12 DIAGNOSIS — H35.013: ICD-10-CM

## 2024-01-12 DIAGNOSIS — H43.392: ICD-10-CM

## 2024-01-12 DIAGNOSIS — H16.223: ICD-10-CM

## 2024-01-12 DIAGNOSIS — H40.023: ICD-10-CM

## 2024-01-12 PROCEDURE — 99212 OFFICE O/P EST SF 10 MIN: CPT | Performed by: OPHTHALMOLOGY

## 2024-01-12 ASSESSMENT — REFRACTION_MANIFEST
OS_ADD: +1.25
OS_AXIS: 180
OD_AXIS: 180
OD_ADD: +1.25
OD_CYLINDER: +0.25
OS_CYLINDER: +0.25
OS_SPHERE: +0.75
OD_SPHERE: +0.75

## 2024-01-12 ASSESSMENT — SPHEQUIV_DERIVED
OD_SPHEQUIV: 1.25
OS_SPHEQUIV: 0.875
OS_SPHEQUIV: 1.5
OD_SPHEQUIV: 0.875

## 2024-01-12 ASSESSMENT — SUPERFICIAL PUNCTATE KERATITIS (SPK)
OS_SPK: 1+
OD_SPK: 1+

## 2024-01-12 ASSESSMENT — REFRACTION_AUTOREFRACTION
OS_CYLINDER: +1.00
OD_SPHERE: +0.75
OD_CYLINDER: +1.00
OS_AXIS: 140
OD_AXIS: 010
OS_SPHERE: +1.00

## 2024-01-12 ASSESSMENT — CONFRONTATIONAL VISUAL FIELD TEST (CVF)
OD_FINDINGS: FULL
OS_FINDINGS: FULL

## 2024-08-06 ENCOUNTER — HOSPITAL ENCOUNTER (OUTPATIENT)
Dept: HOSPITAL 74 - JASU-ENDO | Age: 54
Discharge: HOME | End: 2024-08-06
Attending: INTERNAL MEDICINE
Payer: COMMERCIAL

## 2024-08-06 VITALS — HEART RATE: 95 BPM

## 2024-08-06 VITALS — SYSTOLIC BLOOD PRESSURE: 90 MMHG | DIASTOLIC BLOOD PRESSURE: 61 MMHG | TEMPERATURE: 97.4 F

## 2024-08-06 VITALS — BODY MASS INDEX: 36.1 KG/M2

## 2024-08-06 VITALS — RESPIRATION RATE: 18 BRPM

## 2024-08-06 DIAGNOSIS — K44.9: ICD-10-CM

## 2024-08-06 DIAGNOSIS — K57.30: ICD-10-CM

## 2024-08-06 DIAGNOSIS — K29.50: ICD-10-CM

## 2024-08-06 DIAGNOSIS — K64.8: ICD-10-CM

## 2024-08-06 DIAGNOSIS — D12.2: ICD-10-CM

## 2024-08-06 DIAGNOSIS — K21.00: ICD-10-CM

## 2024-08-06 DIAGNOSIS — Z86.010: ICD-10-CM

## 2024-08-06 DIAGNOSIS — Z12.11: Primary | ICD-10-CM

## 2024-08-06 DIAGNOSIS — D12.0: ICD-10-CM

## 2024-08-06 PROCEDURE — 0DB78ZX EXCISION OF STOMACH, PYLORUS, VIA NATURAL OR ARTIFICIAL OPENING ENDOSCOPIC, DIAGNOSTIC: ICD-10-PCS | Performed by: INTERNAL MEDICINE

## 2024-08-06 PROCEDURE — 0DBF8ZX EXCISION OF RIGHT LARGE INTESTINE, VIA NATURAL OR ARTIFICIAL OPENING ENDOSCOPIC, DIAGNOSTIC: ICD-10-PCS | Performed by: INTERNAL MEDICINE

## 2024-08-06 PROCEDURE — 0DB68ZX EXCISION OF STOMACH, VIA NATURAL OR ARTIFICIAL OPENING ENDOSCOPIC, DIAGNOSTIC: ICD-10-PCS | Performed by: INTERNAL MEDICINE

## 2024-08-06 PROCEDURE — 0DBH8ZX EXCISION OF CECUM, VIA NATURAL OR ARTIFICIAL OPENING ENDOSCOPIC, DIAGNOSTIC: ICD-10-PCS | Performed by: INTERNAL MEDICINE

## 2024-08-06 PROCEDURE — 0DBN8ZX EXCISION OF SIGMOID COLON, VIA NATURAL OR ARTIFICIAL OPENING ENDOSCOPIC, DIAGNOSTIC: ICD-10-PCS | Performed by: INTERNAL MEDICINE

## 2024-08-06 PROCEDURE — 0DB48ZX EXCISION OF ESOPHAGOGASTRIC JUNCTION, VIA NATURAL OR ARTIFICIAL OPENING ENDOSCOPIC, DIAGNOSTIC: ICD-10-PCS | Performed by: INTERNAL MEDICINE

## 2024-08-06 PROCEDURE — 0DB98ZX EXCISION OF DUODENUM, VIA NATURAL OR ARTIFICIAL OPENING ENDOSCOPIC, DIAGNOSTIC: ICD-10-PCS | Performed by: INTERNAL MEDICINE
